# Patient Record
Sex: FEMALE | Race: WHITE | NOT HISPANIC OR LATINO | URBAN - NONMETROPOLITAN AREA
[De-identification: names, ages, dates, MRNs, and addresses within clinical notes are randomized per-mention and may not be internally consistent; named-entity substitution may affect disease eponyms.]

---

## 2018-03-09 ENCOUNTER — OFFICE VISIT (OUTPATIENT)
Dept: ORTHOPEDIC SURGERY | Facility: CLINIC | Age: 38
End: 2018-03-09

## 2018-03-09 VITALS — WEIGHT: 204 LBS | BODY MASS INDEX: 32.02 KG/M2 | HEIGHT: 67 IN

## 2018-03-09 DIAGNOSIS — M25.572 ACUTE LEFT ANKLE PAIN: Primary | ICD-10-CM

## 2018-03-09 DIAGNOSIS — S82.65XA CLOSED NONDISPLACED FRACTURE OF LATERAL MALLEOLUS OF LEFT FIBULA, INITIAL ENCOUNTER: ICD-10-CM

## 2018-03-09 PROCEDURE — 27786 TREATMENT OF ANKLE FRACTURE: CPT | Performed by: NURSE PRACTITIONER

## 2018-03-09 PROCEDURE — 99214 OFFICE O/P EST MOD 30 MIN: CPT | Performed by: NURSE PRACTITIONER

## 2018-03-09 RX ORDER — HYDROCODONE BITARTRATE AND ACETAMINOPHEN 7.5; 325 MG/1; MG/1
1 TABLET ORAL EVERY 6 HOURS PRN
Qty: 40 TABLET | Refills: 0 | Status: SHIPPED | OUTPATIENT
Start: 2018-03-09 | End: 2018-03-19

## 2018-03-09 RX ORDER — IBUPROFEN 800 MG/1
800 TABLET ORAL EVERY 8 HOURS PRN
Qty: 90 TABLET | Refills: 0 | Status: SHIPPED | OUTPATIENT
Start: 2018-03-09 | End: 2018-04-08

## 2018-03-09 NOTE — PROGRESS NOTES
Alicia Hughes is a 38 y.o. female   Primary provider:  Sylvain Oliveira MD       Chief Complaint   Patient presents with   • Left Ankle - Establish Care     Xray done today         HISTORY OF PRESENT ILLNESS:    HPI Comments: Patient complains of left ankle/pain injury after rolling her ankle last night at home. This occurred while walking. She did not fall. Patient reports pain has worsened since the injury last night. Pain is described as intermittent and mild in severity. Pain is described as stabbing in nature with associated swelling. Pain is worse with movement of the left ankle and weight-bearing. Pain improves with rest.     Ankle Injury    Incident onset: 3/8/2018. The incident occurred at home. The injury mechanism was an inversion injury. The pain is present in the left ankle. The quality of the pain is described as aching and stabbing. The pain is moderate. The pain has been constant since onset. Associated symptoms include numbness (mild, toes). Associated symptoms comments: Popping and swelling  . She reports no foreign bodies present. The symptoms are aggravated by weight bearing and movement. She has tried nothing for the symptoms.        CONCURRENT MEDICAL HISTORY:    No past medical history on file.    No Known Allergies      Current Outpatient Prescriptions:   •  ARMOUR THYROID PO, Take  by mouth., Disp: , Rfl:   •  HYDROcodone-acetaminophen (NORCO) 7.5-325 MG per tablet, Take 1 tablet by mouth Every 6 (Six) Hours As Needed for Moderate Pain  for up to 10 days., Disp: 40 tablet, Rfl: 0  •  ibuprofen (ADVIL,MOTRIN) 800 MG tablet, Take 1 tablet by mouth Every 8 (Eight) Hours As Needed (pain) for up to 30 days., Disp: 90 tablet, Rfl: 0    No past surgical history on file.    No family history on file.    Social History     Social History   • Marital status:      Spouse name: N/A   • Number of children: N/A   • Years of education: N/A     Occupational History   • Not on file.  "    Social History Main Topics   • Smoking status: Never Smoker   • Smokeless tobacco: Not on file   • Alcohol use No   • Drug use: No   • Sexual activity: Not on file     Other Topics Concern   • Not on file     Social History Narrative        Review of Systems   Musculoskeletal: Positive for gait problem and joint swelling.        Left ankle pain/swelling.    Neurological: Positive for numbness (mild, toes).   All other systems reviewed and are negative.      PHYSICAL EXAMINATION:       Ht 170.2 cm (67\")  Wt 92.5 kg (204 lb)  BMI 31.95 kg/m2    Physical Exam   Constitutional: She is oriented to person, place, and time. Vital signs are normal. She appears well-developed and well-nourished.   HENT:   Head: Normocephalic.   Pulmonary/Chest: Effort normal. No respiratory distress.   Abdominal: Soft. She exhibits no distension.   Neurological: She is alert and oriented to person, place, and time. GCS eye subscore is 4. GCS verbal subscore is 5. GCS motor subscore is 6.   Skin: Skin is warm, dry and intact.   Psychiatric: She has a normal mood and affect. Her speech is normal and behavior is normal. Judgment and thought content normal. Cognition and memory are normal.   Vitals reviewed.      GAIT:     []  Normal  [x]  Antalgic    Assistive device: [x]  None  []  Walker     []  Crutches  []  Cane     []  Wheelchair  []  Stretcher    Right Ankle Exam   Swelling: none    Tenderness   The patient is experiencing no tenderness.    Range of Motion   The patient has normal right ankle ROM.    Muscle Strength   The patient has normal right ankle strength.  Other   Erythema: absent  Sensation: normal  Pulse: present       Left Ankle Exam   Swelling: moderate (lateral ankle)    Tenderness   The patient is experiencing tenderness in the lateral malleolus.     Range of Motion   Dorsiflexion: abnormal   Plantar flexion: abnormal   Inversion: abnormal   Eversion: abnormal     Other   Erythema: absent  Sensation: decreased " (toes)  Pulse: present    Comments:  No deformity noted.  No ecchymosis.  Moderate swelling to the lateral ankle present.  Range of motion and strength assessments are deferred due to acute distal fibula fracture.            Xr Ankle 3+ View Left    Result Date: 3/9/2018  Narrative: 3 views of the left ankle reveal an acute, transverse and mildly displaced fracture at the tip of the lateral malleolus.  There is also an acute, obliquely-oriented nondisplaced fracture of the distal fibula/lateral malleolus noted, best visualized on the oblique view.  Ankle mortise is intact.  There is soft tissue swelling present to the lateral ankle.  There are no previous films available for comparison.  No other acute radiologic abnormalities are noted at this time.03/09/18 at 12:05 PM by RUY Galvan         ASSESSMENT:    Diagnoses and all orders for this visit:    Acute left ankle pain    Closed nondisplaced fracture of lateral malleolus of left fibula, initial encounter    PLAN    X-rays of left ankle reviewed today. Discussed findings of lateral malleolus fracture with patient and spouse. Recommend Orthoboot for immobilization of fracture. Recommend non-weightbearing with use of crutches for now. Discussed with patient gradually increasing weight-bearing as her pain improves. Instructed patient that weight-bearing should be as tolerated and based on her pain. Recommend elevation of left ankle above the heart to minimize swelling. Recommend ice therapy intermittently 3 to 4 times daily to left ankle to minimize swelling/pain. Norco prescribed for pain. Recommend Ibuprofen also in conjunction with Norco or alone for milder pain. Follow up in one week for recheck and repeat x-rays at that time.     Return in about 1 week (around 3/16/2018) for Recheck.      This document has been electronically signed by RUY Galvan on March 9, 2018 1:57 PM      RUY Galvan

## 2018-03-16 ENCOUNTER — OFFICE VISIT (OUTPATIENT)
Dept: ORTHOPEDIC SURGERY | Facility: CLINIC | Age: 38
End: 2018-03-16

## 2018-03-16 VITALS — HEIGHT: 67 IN | BODY MASS INDEX: 32.49 KG/M2 | WEIGHT: 207 LBS

## 2018-03-16 DIAGNOSIS — S82.65XA CLOSED NONDISPLACED FRACTURE OF LATERAL MALLEOLUS OF LEFT FIBULA, INITIAL ENCOUNTER: Primary | ICD-10-CM

## 2018-03-16 DIAGNOSIS — S82.65XD CLOSED NONDISPLACED FRACTURE OF LATERAL MALLEOLUS OF LEFT FIBULA WITH ROUTINE HEALING, SUBSEQUENT ENCOUNTER: ICD-10-CM

## 2018-03-16 DIAGNOSIS — M25.572 ACUTE LEFT ANKLE PAIN: Primary | ICD-10-CM

## 2018-03-16 PROCEDURE — 99024 POSTOP FOLLOW-UP VISIT: CPT | Performed by: NURSE PRACTITIONER

## 2018-03-16 NOTE — PROGRESS NOTES
"Alicia Hughes is a 38 y.o. female returns for     Chief Complaint   Patient presents with   • Left Ankle - Follow-up     Repeat xray done today.        HISTORY OF PRESENT ILLNESS: Patient presents to office for follow up of left distal fibula fracture. Patient continues to use Orthoboot and crutches. She continues to intermittently elevate her left ankle. X-rays repeated today.      CONCURRENT MEDICAL HISTORY:    History reviewed. No pertinent past medical history.    No Known Allergies      Current Outpatient Prescriptions:   •  ARMOUR THYROID PO, Take  by mouth., Disp: , Rfl:   •  HYDROcodone-acetaminophen (NORCO) 7.5-325 MG per tablet, Take 1 tablet by mouth Every 6 (Six) Hours As Needed for Moderate Pain  for up to 10 days., Disp: 40 tablet, Rfl: 0  •  ibuprofen (ADVIL,MOTRIN) 800 MG tablet, Take 1 tablet by mouth Every 8 (Eight) Hours As Needed (pain) for up to 30 days., Disp: 90 tablet, Rfl: 0    No past surgical history on file.    ROS  No fevers or chills.  No chest pain or shortness of air.  No GI or  disturbances. Left ankle pain/swelling.     PHYSICAL EXAMINATION:       Ht 170.2 cm (67\")   Wt 93.9 kg (207 lb)   BMI 32.42 kg/m²     Physical Exam   Constitutional: She is oriented to person, place, and time. Vital signs are normal. She appears well-developed and well-nourished. She is active and cooperative. She does not appear ill. No distress.   HENT:   Head: Normocephalic.   Pulmonary/Chest: Effort normal. No respiratory distress.   Abdominal: Soft. She exhibits no distension.   Neurological: She is alert and oriented to person, place, and time. GCS eye subscore is 4. GCS verbal subscore is 5. GCS motor subscore is 6.   Skin: Skin is warm, dry and intact. Capillary refill takes less than 2 seconds.   Psychiatric: She has a normal mood and affect. Her speech is normal and behavior is normal. Judgment and thought content normal. Cognition and memory are normal.   Vitals reviewed.      GAIT:     [] "  Normal  [x]  Antalgic    Assistive device: []  None  []  Walker     [x]  Crutches  []  Cane     []  Wheelchair  []  Stretcher    Right Ankle Exam   Swelling: none    Tenderness   The patient is experiencing no tenderness.  Other   Erythema: absent  Sensation: normal  Pulse: present       Left Ankle Exam   Swelling: moderate (lateral ankle)    Tenderness   The patient is experiencing tenderness in the lateral malleolus.     Other   Erythema: absent  Sensation: normal  Pulse: present    Comments:  No deformity noted.  No ecchymosis.  Moderate swelling to the lateral ankle present.  Range of motion and strength assessments are deferred due to acute distal fibula fracture.            Xr Ankle 3+ View Left    Result Date: 3/9/2018  Narrative: 3 views of the left ankle reveal an acute, transverse and mildly displaced fracture at the tip of the lateral malleolus.  There is also an acute, obliquely-oriented nondisplaced fracture of the distal fibula/lateral malleolus noted, best visualized on the oblique view.  Ankle mortise is intact.  There is soft tissue swelling present to the lateral ankle.  There are no previous films available for comparison.  No other acute radiologic abnormalities are noted at this time.03/09/18 at 12:05 PM by RUY Galvan   Xr Ankle 3+ View Left    Result Date: 3/16/2018  Narrative: 3 views of the left ankle reveal a stable transverse and mildly displaced fracture at the tip of the lateral malleolus. No significant changes are noted when compared with previous images from 3/9/2018. The previously noted obliquely, nondisplaced fracture of the distal fibula/lateral malleolus seen on the oblique view on 3/9/2018 is not well-visualized today.  Ankle mortise is intact.  There is soft tissue swelling present to the lateral ankle. No other acute radiologic abnormalities are noted at this time. 03/16/18 at 8:58 AM by RUY Galvan          ASSESSMENT:    Diagnoses and all orders for this  visit:    Acute left ankle pain    Closed nondisplaced fracture of lateral malleolus of left fibula with routine healing, subsequent encounter    PLAN    X-rays of left ankle reviewed and compared with previous images from 3/9/2018. Left distal fibula fracture is stable. Patient is doing well with her Orthoboot and crutches. Recommend to continue with Orthoboot for immobilization/support. Recommend to continue with modified weight-bearing with use of her crutches. Discussed gradual progression of weight-bearing over the next few weeks as tolerated only and based on her pain. Continue with frequent elevation of the left ankle to minimize swelling. Continue with ice therapy intermittently 3 times daily for 20 minutes at a time to minimize swelling/pain. Continue Norco as needed for moderate to severe pain. Patient can take Ibuprofen in conjunction with Norco or alone for milder pain. Follow up in 4 weeks for recheck and repeat x-rays at that time. Plan to transition to ankle brace at that time if pain is improving and ankle is healing.     Return in about 4 weeks (around 4/13/2018) for Recheck.      This document has been electronically signed by RUY Galvan on March 16, 2018 8:59 AM      RUY Galvan

## 2021-08-19 ENCOUNTER — OFFICE VISIT (OUTPATIENT)
Dept: OBSTETRICS AND GYNECOLOGY | Facility: CLINIC | Age: 41
End: 2021-08-19

## 2021-08-19 VITALS
WEIGHT: 193 LBS | SYSTOLIC BLOOD PRESSURE: 130 MMHG | DIASTOLIC BLOOD PRESSURE: 88 MMHG | HEIGHT: 68 IN | BODY MASS INDEX: 29.25 KG/M2

## 2021-08-19 DIAGNOSIS — Z01.419 ENCOUNTER FOR GYNECOLOGICAL EXAMINATION WITHOUT ABNORMAL FINDING: Primary | ICD-10-CM

## 2021-08-19 DIAGNOSIS — Z12.31 ENCOUNTER FOR SCREENING MAMMOGRAM FOR MALIGNANT NEOPLASM OF BREAST: ICD-10-CM

## 2021-08-19 PROCEDURE — 99386 PREV VISIT NEW AGE 40-64: CPT | Performed by: NURSE PRACTITIONER

## 2021-08-19 NOTE — PROGRESS NOTES
Clark Hughes is a 41 y.o. presents for annual exam. No concerns at this time.    LMP- 7/2021  Last pap~ 2013; hx of LEEP in 2001, all normal since  Last mammo- never    Pt's mother recently dx with breast cancer    Gynecologic Exam  The patient's pertinent negatives include no genital itching (  ), genital lesions, genital odor, genital rash, missed menses, pelvic pain, vaginal bleeding or vaginal discharge. Pertinent negatives include no abdominal pain, chills, constipation, diarrhea, dysuria, fever, frequency or urgency. She is sexually active. No, her partner does not have an STD. She uses vasectomy for contraception. Her menstrual history has been regular.       The following portions of the patient's history were reviewed and updated as appropriate: allergies, current medications, past family history, past medical history, past social history, past surgical history and problem list.    Review of Systems   Constitutional: Negative for activity change, appetite change, chills, diaphoresis, fatigue, fever, unexpected weight gain and unexpected weight loss.   Respiratory: Negative for chest tightness and shortness of breath.    Cardiovascular: Negative for chest pain and palpitations.   Gastrointestinal: Negative for abdominal distention, abdominal pain, constipation and diarrhea.   Endocrine: Negative.    Genitourinary: Negative for amenorrhea, breast discharge, breast lump, breast pain, decreased libido, decreased urine volume, difficulty urinating, dyspareunia, dysuria, frequency, genital sores, menstrual problem, missed menses, pelvic pain, pelvic pressure, urgency, urinary incontinence, vaginal bleeding, vaginal discharge and vaginal pain.   Musculoskeletal: Negative for myalgias.   Skin: Negative for color change, dry skin and skin lesions.   Neurological: Negative for light-headedness and headache.   Psychiatric/Behavioral: Negative for agitation, dysphoric mood, sleep disturbance,  depressed mood and stress. The patient is not nervous/anxious.        Objective   Physical Exam  Vitals and nursing note reviewed.   Constitutional:       General: She is awake. She is not in acute distress.     Appearance: Normal appearance. She is well-developed, well-groomed and overweight. She is not ill-appearing, toxic-appearing or diaphoretic.   Neck:      Thyroid: No thyroid mass, thyromegaly or thyroid tenderness.   Cardiovascular:      Rate and Rhythm: Normal rate and regular rhythm.      Heart sounds: Normal heart sounds.   Pulmonary:      Effort: Pulmonary effort is normal.      Breath sounds: Normal breath sounds.   Chest:      Breasts: Dk Score is 5. Breasts are symmetrical.         Right: Normal. No swelling, bleeding, inverted nipple, mass, nipple discharge, skin change or tenderness.         Left: Normal. No swelling, bleeding, inverted nipple, mass, nipple discharge, skin change or tenderness.   Abdominal:      General: Bowel sounds are normal. There is no distension.      Palpations: Abdomen is soft.      Tenderness: There is no abdominal tenderness.   Genitourinary:     General: Normal vulva.      Exam position: Lithotomy position.      Dk stage (genital): 5.      Labia:         Right: No rash, tenderness, lesion or injury.         Left: No rash, tenderness, lesion or injury.       Urethra: No prolapse, urethral pain, urethral swelling or urethral lesion.      Vagina: Normal.      Cervix: Normal.      Uterus: Normal.       Adnexa: Right adnexa normal and left adnexa normal.        Right: No mass, tenderness or fullness.          Left: No mass, tenderness or fullness.        Comments: Pap obtained.  Lymphadenopathy:      Upper Body:      Right upper body: No supraclavicular, axillary or pectoral adenopathy.      Left upper body: No supraclavicular, axillary or pectoral adenopathy.      Lower Body: No right inguinal adenopathy. No left inguinal adenopathy.   Skin:     General: Skin is warm  and dry.   Neurological:      Mental Status: She is alert and oriented to person, place, and time.      Gait: Gait is intact.   Psychiatric:         Attention and Perception: Attention and perception normal.         Mood and Affect: Mood and affect normal.         Speech: Speech normal.         Behavior: Behavior normal. Behavior is cooperative.           Assessment/Plan   Diagnoses and all orders for this visit:    1. Encounter for gynecological examination without abnormal finding (Primary)  -     Liquid-based Pap Smear, Screening    2. Encounter for screening mammogram for malignant neoplasm of breast  -     Mammo Screening Digital Tomosynthesis Bilateral With CAD; Future    Reviewed cervical and breast cancer screening recommendations.

## 2021-08-25 LAB
LAB AP CASE REPORT: NORMAL
PATH INTERP SPEC-IMP: NORMAL

## 2022-01-12 ENCOUNTER — HOSPITAL ENCOUNTER (OUTPATIENT)
Dept: GENERAL RADIOLOGY | Facility: HOSPITAL | Age: 42
Discharge: HOME | End: 2022-01-12
Admitting: UROLOGY

## 2022-01-12 DIAGNOSIS — J20.9 ACUTE BRONCHITIS WITH WHEEZING: Primary | ICD-10-CM

## 2022-01-12 DIAGNOSIS — J20.9 ACUTE BRONCHITIS WITH WHEEZING: ICD-10-CM

## 2022-01-12 PROCEDURE — 71046 X-RAY EXAM CHEST 2 VIEWS: CPT

## 2023-05-01 ENCOUNTER — OFFICE VISIT (OUTPATIENT)
Dept: ORTHOPEDIC SURGERY | Facility: CLINIC | Age: 43
End: 2023-05-01
Payer: COMMERCIAL

## 2023-05-01 VITALS — BODY MASS INDEX: 23.34 KG/M2 | WEIGHT: 154 LBS | HEIGHT: 68 IN

## 2023-05-01 DIAGNOSIS — M79.645 CHRONIC PAIN OF LEFT THUMB: Primary | ICD-10-CM

## 2023-05-01 DIAGNOSIS — G89.29 CHRONIC PAIN OF LEFT THUMB: Primary | ICD-10-CM

## 2023-05-01 DIAGNOSIS — M79.645 PAIN OF LEFT THUMB: Primary | ICD-10-CM

## 2023-05-01 DIAGNOSIS — S67.02XA CRUSHING INJURY OF LEFT THUMB, INITIAL ENCOUNTER: ICD-10-CM

## 2023-05-01 PROBLEM — M79.642 LEFT HAND PAIN: Status: ACTIVE | Noted: 2023-05-01

## 2023-05-01 RX ORDER — HYDROCODONE BITARTRATE AND ACETAMINOPHEN 5; 325 MG/1; MG/1
1 TABLET ORAL EVERY 6 HOURS PRN
Qty: 30 TABLET | Refills: 0 | Status: SHIPPED | OUTPATIENT
Start: 2023-05-01 | End: 2023-05-11

## 2023-05-01 RX ORDER — HYDROCODONE BITARTRATE AND ACETAMINOPHEN 5; 325 MG/1; MG/1
1 TABLET ORAL EVERY 6 HOURS PRN
Qty: 30 TABLET | Refills: 0 | Status: SHIPPED | OUTPATIENT
Start: 2023-05-01 | End: 2023-05-01 | Stop reason: SDUPTHER

## 2023-05-01 NOTE — PROGRESS NOTES
Alicia Hughes is a 43 y.o. female   Primary provider:  D'Amico, Anna M., MD       Chief Complaint   Patient presents with   • Left Hand - Pain, Initial Evaluation     Left thumb pain/injury     HISTORY OF PRESENT ILLNESS:    History of Present Illness  43-year-old female patient presents to office for evaluation of acute left thumb pain/injury.  Initial injury occurred on 4/30/2023 when she was using a hammer with her right hand while holding a nail with her left hand and accidentally struck the inside of her left thumb.  Patient had acute onset of pain and swelling.  Pain is described as constant and moderate to severe.  Pain is described as aching, crushing and burning in nature with associated redness, bruising and swelling.  Pain is worse with movement/use of her left thumb but the pain is also present at rest.  Pain improves minimally with rest and Ibuprofen.  X-rays of the left thumb performed in office today.  Current pain scale is 6/10.     CONCURRENT MEDICAL HISTORY:    Past Medical History:   Diagnosis Date   • Disease of thyroid gland        No Known Allergies      Current Outpatient Medications:   •  HYDROcodone-acetaminophen (NORCO) 5-325 MG per tablet, Take 1 tablet by mouth Every 6 (Six) Hours As Needed for Moderate Pain or Severe Pain for up to 10 days., Disp: 30 tablet, Rfl: 0  •  NON FORMULARY, BioTe pellets, Bio Identical hormone replacement, Disp: , Rfl:   •  ARMOUR THYROID PO, Take  by mouth. (Patient not taking: Reported on 5/1/2023), Disp: , Rfl:     History reviewed. No pertinent surgical history.    Family History   Problem Relation Age of Onset   • Breast cancer Mother    • Hypertension Father         Social History     Socioeconomic History   • Marital status:    Tobacco Use   • Smoking status: Never   Substance and Sexual Activity   • Alcohol use: Yes   • Drug use: No   • Sexual activity: Yes        Review of Systems   Constitutional: Negative.    HENT: Negative.    Eyes:  "Negative.    Respiratory: Negative.    Cardiovascular: Negative.    Gastrointestinal: Negative.    Endocrine: Negative.    Genitourinary: Negative.    Musculoskeletal: Positive for arthralgias and joint swelling.        Left thumb pain/swelling.    Skin: Negative.    Allergic/Immunologic: Negative.    Neurological: Negative.    Hematological: Negative.    Psychiatric/Behavioral: Negative.        PHYSICAL EXAMINATION:       Ht 172.7 cm (68\")   Wt 69.9 kg (154 lb)   BMI 23.42 kg/m²     Physical Exam  Vitals reviewed.   Constitutional:       General: She is not in acute distress.     Appearance: She is well-developed. She is not ill-appearing.   HENT:      Head: Normocephalic.   Pulmonary:      Effort: Pulmonary effort is normal. No respiratory distress.   Abdominal:      General: There is no distension.      Palpations: Abdomen is soft.   Musculoskeletal:         General: Swelling (Mild, left thumb), tenderness (Left thumb) and signs of injury (Left thumb) present. No deformity.   Skin:     General: Skin is warm and dry.      Capillary Refill: Capillary refill takes less than 2 seconds.      Findings: No erythema.   Neurological:      Mental Status: She is alert and oriented to person, place, and time.      GCS: GCS eye subscore is 4. GCS verbal subscore is 5. GCS motor subscore is 6.   Psychiatric:         Speech: Speech normal.         Behavior: Behavior normal.         Thought Content: Thought content normal.         Judgment: Judgment normal.         GAIT:     [x]  Normal  []  Antalgic    Assistive device: [x]  None  []  Walker     []  Crutches  []  Cane     []  Wheelchair  []  Stretcher    Left Hand Exam     Tenderness   Left hand tenderness location: Left thumb.     Range of Motion   Wrist   Extension: normal   Flexion: normal   Pronation: normal   Supination: normal   Hand   MP Thumb: normal   DIP Thumb: abnormal     Muscle Strength   Left wrist normal muscle strength: deferred.    Other   Erythema: " absent  Sensation: normal  Pulse: present    Comments:  Pain and limitations at the DIP joint of the left thumb.  Tenderness to palpation at the DIP joint and distal aspect of the left thumb.  There is ecchymosis noted under the most lateral aspect of the fingernail.  No obvious nailbed damage. The nail remains intact.  Mild, generalized swelling noted in the distal thumb.  Skin is intact.  No warmth or erythema.  No deformity.            XR Finger 2+ View Left    Result Date: 5/1/2023  Narrative: XR FINGER LEFT MIN 2 VIEWS HISTORY: Pain. FINDINGS: No acute osseous abnormality.  Mild degenerative changes of the first carpometacarpal and triscaphe joints.     ASSESSMENT:    Diagnoses and all orders for this visit:    Pain of left thumb  -     HYDROcodone-acetaminophen (NORCO) 5-325 MG per tablet; Take 1 tablet by mouth Every 6 (Six) Hours As Needed for Moderate Pain or Severe Pain for up to 10 days.    Crushing injury of left thumb, initial encounter  -     HYDROcodone-acetaminophen (NORCO) 5-325 MG per tablet; Take 1 tablet by mouth Every 6 (Six) Hours As Needed for Moderate Pain or Severe Pain for up to 10 days.    PLAN    X-rays of the left thumb performed in office today and reviewed with no acute findings noted.  Specifically, there is no obvious evidence of fracture noted.  The patient has experienced acute left distal thumb pain/swelling after accidentally striking it with a hammer.  She has tried managing it with Ibuprofen but continues to have fairly significant pain.  Despite no evidence of acute fracture, we discussed the crush injuries are often quite painful but this should gradually improve.  Recommend immobilization with an AlumaFoam splint.  This is placed/fitted in office today.  We discussed use of the splint for the next 1 to 2 weeks to facilitate healing and improve pain.  However, we discussed that she can remove the splint and perform some gentle range of motion exercises (as pain allows)  intermittently throughout the day. We also discussed that if her pain resolves, she can discontinue the splint at her discretion. Recommend elevation and ice therapy to the left thumb as needed to minimize pain/swelling/inflammation.    Recommend to continue with Ibuprofen as needed for pain control.  Patient can also take Tylenol as needed for additional pain control.  At this time, the pain has been fairly significant and poorly controlled with Ibuprofen.  A short course of Norco 5 mg is prescribed to take as needed for moderate to severe pain that is uncontrolled with nonopioid pain management methods. Patient is instructed to take the pain medication sparingly and to take the least amount of pain medication needed to control the pain.  Patient is cautioned that opioids can be addictive.  We discussed other potential adverse side effects of opioids including constipation, dizziness, drowsiness, increased risk for falls and/or respiratory depression.  Patient verbalized understanding of these risks.  I have encouraged the patient to focus on nonopioid pain management methods as much as possible.  SISSY is reviewed internally via Epic and is noted to be appropriate.     Follow-up in 2 weeks for recheck as needed for any new, worsening or persistent symptoms.  If pain persists, then I would recommend a repeat x-ray in about 2 weeks to evaluate for occult fracture.    Time spent of a minimum of 30 minutes including the face to face evaluation, reviewing of medical history and prior medial records, reviewing of diagnostic studies, prescription drug management, documentation, patient education and coordination of care.     EMR Dragon/Transciption Disclaimer: Some of this note may be an electronic transcription/translation of spoken language to printed text using the Dragon Dictation System.     Return in about 2 weeks (around 5/15/2023), or if symptoms worsen or fail to improve, for Recheck.        This document has  been electronically signed by RUY Galvan on May 3, 2023 08:45 CDT      RUY Galvan

## 2023-05-03 PROBLEM — S67.02XA CRUSHING INJURY OF LEFT THUMB: Status: ACTIVE | Noted: 2023-05-03
